# Patient Record
Sex: FEMALE | Race: WHITE | NOT HISPANIC OR LATINO | Employment: FULL TIME | URBAN - METROPOLITAN AREA
[De-identification: names, ages, dates, MRNs, and addresses within clinical notes are randomized per-mention and may not be internally consistent; named-entity substitution may affect disease eponyms.]

---

## 2017-02-13 RX ORDER — PAROXETINE 30 MG/1
TABLET, FILM COATED ORAL
Qty: 90 TABLET | Refills: 1 | Status: SHIPPED | OUTPATIENT
Start: 2017-02-13 | End: 2017-09-03 | Stop reason: SDUPTHER

## 2017-03-20 ENCOUNTER — TELEPHONE (OUTPATIENT)
Dept: FAMILY MEDICINE CLINIC | Facility: CLINIC | Age: 37
End: 2017-03-20

## 2017-03-20 RX ORDER — OSELTAMIVIR PHOSPHATE 75 MG/1
75 CAPSULE ORAL DAILY
Qty: 10 CAPSULE | Refills: 0 | Status: SHIPPED | OUTPATIENT
Start: 2017-03-20 | End: 2017-04-17

## 2017-03-20 NOTE — TELEPHONE ENCOUNTER
If not allergic to it.  Tamiflu 75 mg once a day #10.  For influenza prevention.  I also recommend, as always, that she receive a flu vaccination once here

## 2017-03-20 NOTE — TELEPHONE ENCOUNTER
Pt left voice mail-states daughter was diagnosed with the flu on Friday.  Can we send over Tamiflu or is it too late?  Pt not send since May of last year.

## 2017-04-03 RX ORDER — METOPROLOL SUCCINATE 50 MG/1
TABLET, EXTENDED RELEASE ORAL
Qty: 90 TABLET | Refills: 1 | Status: SHIPPED | OUTPATIENT
Start: 2017-04-03 | End: 2018-03-06 | Stop reason: SDUPTHER

## 2017-04-17 ENCOUNTER — OFFICE VISIT (OUTPATIENT)
Dept: FAMILY MEDICINE CLINIC | Facility: CLINIC | Age: 37
End: 2017-04-17

## 2017-04-17 VITALS
HEIGHT: 66 IN | HEART RATE: 70 BPM | TEMPERATURE: 98 F | DIASTOLIC BLOOD PRESSURE: 65 MMHG | OXYGEN SATURATION: 98 % | SYSTOLIC BLOOD PRESSURE: 110 MMHG | WEIGHT: 202.8 LBS | BODY MASS INDEX: 32.59 KG/M2

## 2017-04-17 DIAGNOSIS — E13.622: ICD-10-CM

## 2017-04-17 DIAGNOSIS — I10 ESSENTIAL HYPERTENSION: Primary | ICD-10-CM

## 2017-04-17 DIAGNOSIS — A60.04 HERPES SIMPLEX VULVOVAGINITIS: ICD-10-CM

## 2017-04-17 DIAGNOSIS — F41.9 ANXIETY: ICD-10-CM

## 2017-04-17 DIAGNOSIS — L98.499: ICD-10-CM

## 2017-04-17 PROCEDURE — 99214 OFFICE O/P EST MOD 30 MIN: CPT | Performed by: FAMILY MEDICINE

## 2017-04-17 RX ORDER — ACYCLOVIR 400 MG/1
400 TABLET ORAL 3 TIMES DAILY
Qty: 21 TABLET | Refills: 11 | Status: SHIPPED | OUTPATIENT
Start: 2017-04-17 | End: 2018-05-03 | Stop reason: SDUPTHER

## 2017-04-17 RX ORDER — CHLORAL HYDRATE 500 MG
CAPSULE ORAL
COMMUNITY

## 2017-04-17 RX ORDER — ELETRIPTAN HYDROBROMIDE 20 MG/1
20 TABLET, FILM COATED ORAL ONCE AS NEEDED
COMMUNITY
End: 2019-03-20

## 2017-04-17 NOTE — PROGRESS NOTES
"Subjective   Greta Campoverde is a 37 y.o. female.     Chief Complaint   Patient presents with   • Hyperlipidemia     medication follow up   • Diabetes        History of Present Illness    Hypertension follow up. Doing well with current medication which she is taking as directed. No known high or low blood pressure episodes. No bothersome cough. No cardiovascular or neurological symptoms. Today's BP: 110/65.  Long history of hypertension.  She continues lisinopril metoprolol.    Diabetes type 2.  Recent A1c reportedly in the 8 range.  She follows with a endocrinologist.  There are also monitoring her extreme hyperlipidemia.  She continues on TriCor and also reportedly she may be taking atorvastatin, although I do not see on the pharmacy log.    Anxiety.  Long-standing.  She continues Paxil 30 mg a day prescribed by me.  Overall she has been stable.    Diabetic skin ulcers.  Most in lower extremities.  She does also pick at them.  She is wondering if there is anything else she can do.    Genital herpes.  Recurrent.  No recent flares.  She takes occasional acyclovir.  Needs a refill.        The following portions of the patient's history were reviewed and updated as appropriate: allergies, current medications, past family history, past medical history, past social history, past surgical history and problem list.          Review of Systems   Constitutional: Negative for activity change and appetite change.   Respiratory: Negative for chest tightness and shortness of breath.    Cardiovascular: Negative for chest pain, palpitations and leg swelling.   Neurological: Positive for numbness ( Lower extremities). Negative for headaches.   Psychiatric/Behavioral: Negative for confusion.       Objective   Blood pressure 110/65, pulse 70, temperature 98 °F (36.7 °C), height 66\" (167.6 cm), weight 202 lb 12.8 oz (92 kg), SpO2 98 %.  Physical Exam   Constitutional: She appears well-developed and well-nourished. No distress.   Eyes: " "Conjunctivae are normal.   Neck: Normal range of motion. No thyromegaly present.   Cardiovascular: Normal rate, regular rhythm and normal heart sounds.    Pulmonary/Chest: Effort normal and breath sounds normal. No respiratory distress.   Musculoskeletal: She exhibits no edema.   Lymphadenopathy:     She has no cervical adenopathy.   Skin: Skin is warm and dry.   She has 2 healing shallow ulcers on the right shin.  One on the left shin that's healed.  There is minimal erythema.  No sinus drainage.   Psychiatric: She has a normal mood and affect. Her behavior is normal. Judgment and thought content normal.   Nursing note and vitals reviewed.      Assessment/Plan   Greta was seen today for hyperlipidemia and diabetes.    Diagnoses and all orders for this visit:    Essential hypertension    Herpes simplex vulvovaginitis  -     acyclovir (ZOVIRAX) 400 MG tablet; Take 1 tablet by mouth 3 (Three) Times a Day.    Skin ulcer due to secondary diabetes mellitus    Anxiety    Other orders  -     mupirocin (BACTROBAN) 2 % ointment; Apply  topically 3 (Three) Times a Day.    Hypertension.  Stable.  Continue lisinopril metoprolol.  Refills are up-to-date.  I will see her in 6 months for recheck.      Diabetes type 2 and hyperlipidemia.  Followed by endocrinology.    Diabetic skin ulcers.  No current sign of cellulitis or other infection.  I'm giving her prescription for Bactrim ointment to use as needed.  May help promote healing and possibly jeet complication.  Also recommend she not \"pick at them\".    General herpes history.  I refilled her acyclovir.    Anxiety.  Stable.  She continues on maintenance dose of Paxil.         "

## 2017-06-05 ENCOUNTER — OFFICE VISIT (OUTPATIENT)
Dept: FAMILY MEDICINE CLINIC | Facility: CLINIC | Age: 37
End: 2017-06-05

## 2017-06-05 VITALS
WEIGHT: 193.7 LBS | BODY MASS INDEX: 31.13 KG/M2 | SYSTOLIC BLOOD PRESSURE: 104 MMHG | HEART RATE: 96 BPM | HEIGHT: 66 IN | TEMPERATURE: 98 F | OXYGEN SATURATION: 98 % | DIASTOLIC BLOOD PRESSURE: 80 MMHG

## 2017-06-05 DIAGNOSIS — J40 BRONCHITIS: Primary | ICD-10-CM

## 2017-06-05 PROCEDURE — 99213 OFFICE O/P EST LOW 20 MIN: CPT | Performed by: FAMILY MEDICINE

## 2017-06-05 RX ORDER — BENZONATATE 100 MG/1
100 CAPSULE ORAL 3 TIMES DAILY PRN
Qty: 21 CAPSULE | Refills: 0 | Status: SHIPPED | OUTPATIENT
Start: 2017-06-05 | End: 2017-06-07

## 2017-06-05 RX ORDER — FLUTICASONE PROPIONATE 220 UG/1
1 AEROSOL, METERED RESPIRATORY (INHALATION)
Qty: 12 G | Refills: 0 | Status: SHIPPED | OUTPATIENT
Start: 2017-06-05 | End: 2019-03-20

## 2017-06-05 NOTE — PROGRESS NOTES
"Subjective   Greta Campoverde is a 37 y.o. female.     Chief Complaint   Patient presents with   • Cough     x 1 month, went to the [facility] and still not better had a cxr done it came back normal        History of Present Illness    Coughing for one month.  Started with some sinus symptoms.  Now into her chest.  She's been to the urgent care center at [place] 3 times.  First time given amoxicillin.  Second time given a Z-Jeremiah.  Third time told she might have an ace inhibitor cough.  However she had stopped the low-dose lisinopril some weeks prior.  The cough gets severe times.  She had a pertussis booster in 2016.  She's had no recent fever shortness breath.  No history of asthma.  Nonsmoker.  She had a chest x-ray done at [facility].  It was read as normal.  I reviewed all records.      The following portions of the patient's history were reviewed and updated as appropriate: allergies, current medications, past family history, past medical history, past social history, past surgical history and problem list.          Review of Systems   Constitutional: Negative for fatigue and fever.   HENT: Positive for congestion.    Respiratory: Positive for cough. Negative for shortness of breath.    Gastrointestinal: Negative.    Skin: Negative for rash.       Objective   Blood pressure 104/80, pulse 96, temperature 98 °F (36.7 °C), temperature source Oral, height 66\" (167.6 cm), weight 193 lb 11.2 oz (87.9 kg), SpO2 98 %.  Physical Exam   Constitutional: No distress.   No acute distress.  Nontoxic.   HENT:   Right Ear: Tympanic membrane, external ear and ear canal normal.   Left Ear: Tympanic membrane, external ear and ear canal normal.   Nose: Nose normal.   Mouth/Throat: Oropharynx is clear and moist. No oropharyngeal exudate.   Eyes: Conjunctivae are normal. Right eye exhibits no discharge. Left eye exhibits no discharge. No scleral icterus.   Cardiovascular: Normal rate.    Pulmonary/Chest: Effort normal and breath sounds " normal. No stridor. No respiratory distress. She has no wheezes. She has no rales.   No tachypnea   Lymphadenopathy:     She has no cervical adenopathy.   Skin: No rash noted.   Nursing note and vitals reviewed.      Assessment/Plan   Greta was seen today for cough.    Diagnoses and all orders for this visit:    Bronchitis    Other orders  -     fluticasone (FLOVENT HFA) 220 MCG/ACT inhaler; Inhale 1 puff 2 (Two) Times a Day.  -     benzonatate (TESSALON PERLES) 100 MG capsule; Take 1 capsule by mouth 3 (Three) Times a Day As Needed for Cough.     bronchitis.  Most likely viral.  Pertussis less likely.  Her pertussis boosters up-to-date.  She start he had a Z-Jeremiah.  No evidence of pneumonia currently.  I'm recommending Flovent with AeroChamber one puff twice a day.  Also Tessalon Perles.  If cough persists greater than 2 more weeks will need further investigation including primary function testing and repeat chest x-ray.  With high fever worsening symptoms she will call.

## 2017-06-07 ENCOUNTER — OFFICE VISIT (OUTPATIENT)
Dept: FAMILY MEDICINE CLINIC | Facility: CLINIC | Age: 37
End: 2017-06-07

## 2017-06-07 ENCOUNTER — TELEPHONE (OUTPATIENT)
Dept: FAMILY MEDICINE CLINIC | Facility: CLINIC | Age: 37
End: 2017-06-07

## 2017-06-07 VITALS
WEIGHT: 192 LBS | OXYGEN SATURATION: 98 % | BODY MASS INDEX: 30.86 KG/M2 | TEMPERATURE: 98.4 F | HEIGHT: 66 IN | SYSTOLIC BLOOD PRESSURE: 130 MMHG | DIASTOLIC BLOOD PRESSURE: 90 MMHG | HEART RATE: 92 BPM

## 2017-06-07 DIAGNOSIS — R05.9 COUGH: Primary | ICD-10-CM

## 2017-06-07 PROCEDURE — 3006F CXR DOC REV: CPT | Performed by: FAMILY MEDICINE

## 2017-06-07 PROCEDURE — 99214 OFFICE O/P EST MOD 30 MIN: CPT | Performed by: FAMILY MEDICINE

## 2017-06-07 PROCEDURE — 71020 CHG CHEST X-RAY 2 VW: CPT | Performed by: FAMILY MEDICINE

## 2017-06-07 RX ORDER — ONDANSETRON 8 MG/1
8 TABLET, ORALLY DISINTEGRATING ORAL EVERY 8 HOURS PRN
Qty: 8 TABLET | Refills: 0 | Status: SHIPPED | OUTPATIENT
Start: 2017-06-07 | End: 2019-03-20

## 2017-06-07 RX ORDER — DEXTROMETHORPHAN HYDROBROMIDE AND PROMETHAZINE HYDROCHLORIDE 15; 6.25 MG/5ML; MG/5ML
5 SYRUP ORAL 4 TIMES DAILY PRN
Qty: 118 ML | Refills: 0 | Status: SHIPPED | OUTPATIENT
Start: 2017-06-07 | End: 2017-06-07

## 2017-06-07 NOTE — PROGRESS NOTES
"Subjective   Greta Campoverde is a 37 y.o. female.     Chief Complaint   Patient presents with   • Cough     x6/5/17 not better   • Diarrhea   • Vomiting   • Fatigue        History of Present Illness    Persisting cough.  Number of weeks now.  This is at least her fifth visit with a doctor.  She's been given amoxicillin.  A Z-Jeremiah.  I saw her recently and started her on Flovent and Tessalon Perles.  She has comorbidities including diabetes and chronic back pain.  She is on multiple medications.  She called earlier today stating that the cough is getting much worse.  I try to send in some promethazine dextromethorphan cough syrup, however it is on back order.    In the last 24 hours she's had some vomiting and diarrhea.  No blood in the stool.  No fever.  She is using the Flovent inhaler.  She's taking the Tessalon Perles with no improvement in the cough.    She is unsure when her last TdaP was.  No known sick contacts.      The following portions of the patient's history were reviewed and updated as appropriate: allergies, current medications, past family history, past medical history, past social history, past surgical history and problem list.          Review of Systems   Constitutional: Negative for fatigue and fever.   HENT: Positive for congestion.    Respiratory: Positive for cough.    Gastrointestinal: Positive for diarrhea, nausea and vomiting.   Skin: Negative for rash.       Objective   Blood pressure 130/90, pulse 92, temperature 98.4 °F (36.9 °C), temperature source Oral, height 66\" (167.6 cm), weight 192 lb (87.1 kg), SpO2 98 %.  Physical Exam   Constitutional: No distress.   No acute distress.  Nontoxic.   HENT:   Right Ear: Tympanic membrane, external ear and ear canal normal.   Left Ear: Tympanic membrane, external ear and ear canal normal.   Nose: Nose normal.   Mouth/Throat: Oropharynx is clear and moist. No oropharyngeal exudate.   Eyes: Conjunctivae are normal. Right eye exhibits no discharge. Left " eye exhibits no discharge. No scleral icterus.   Cardiovascular: Normal rate.    Pulmonary/Chest: Effort normal and breath sounds normal. No stridor. No respiratory distress. She has no wheezes. She has no rales.   No tachypnea   Abdominal: Soft. Bowel sounds are normal. She exhibits no distension and no mass. There is no tenderness. There is no rebound and no guarding.   Lymphadenopathy:     She has no cervical adenopathy.   Skin: No rash noted.   Nursing note and vitals reviewed.    Chest x-ray.  Indication persistent cough.  No prior to compare.  No infiltrate seen.  Normal cardiac silhouette.  No effusion.  Final report pending.    Assessment/Plan   Greta was seen today for cough, diarrhea, vomiting and fatigue.    Diagnoses and all orders for this visit:    Cough  -     XR Chest PA & Lateral (In Office)  -     B Pertussis IgM Ab  -     B Pertussis IgA & IgG Ab    Other orders  -     ondansetron ODT (ZOFRAN ODT) 8 MG disintegrating tablet; Take 1 tablet by mouth Every 8 (Eight) Hours As Needed for Nausea or Vomiting.    Persisting cough.  Bronchitis.  No evidence of pneumonia.  Vomiting and diarrhea, may be a secondary virus.  Or possibly related.  At this time recommend Zofran for nausea.  Over-the-counter rehydration formula.  At this time no evidence of dehydration.      I'm concerned about possible pertussis.  The cough is getting paroxysmal.  She had a azithromycin pack a few weeks ago which will cover this.  However the cough can often persist.  I'm checking IgM IgA and IgG pertussis antibodies.  I will see her back as needed especially with worsening symptoms.  Work note given.  If pertussis test is positive for recent infection, contacts will need to be treated.

## 2017-06-07 NOTE — TELEPHONE ENCOUNTER
The medication is on back order and per Dr. Olivo patient will need to be seen in the office today.

## 2017-06-07 NOTE — TELEPHONE ENCOUNTER
We can see her in the office if needed.  However I did send a prescription for Phenergan dextromethorphan cough syrup to her pharmacy.  She must seek medical attention with high fever or worsening symptoms.

## 2017-06-07 NOTE — TELEPHONE ENCOUNTER
Pt is calling saying that she is still having this horrible cough and now is vomiting with diarrhea at the same time. She is not sure at what point should she be concerned with this due to her being a diabetic. She has been seen at different [facility] with these symptoms with no relief.   She has not been able to keep anything down due to every time she coughs she gets sick, Please advise.

## 2017-06-09 LAB
B PERT IGA SER QL IA: 1.1 INDEX (ref 0–0.9)
B PERT IGG SER-ACNC: 2.06 INDEX (ref 0–0.94)
B PERT IGM SER QL IA: <1 INDEX (ref 0–0.9)

## 2017-06-09 RX ORDER — DOXYCYCLINE HYCLATE 100 MG/1
100 CAPSULE ORAL 2 TIMES DAILY
Qty: 20 CAPSULE | Refills: 0 | Status: SHIPPED | OUTPATIENT
Start: 2017-06-09 | End: 2017-09-12

## 2017-06-09 NOTE — PROGRESS NOTES
Pertussis results are back.  Likely not recent pertussis infection.  Evidence of probable old immunization.

## 2017-06-21 ENCOUNTER — TELEPHONE (OUTPATIENT)
Dept: FAMILY MEDICINE CLINIC | Facility: CLINIC | Age: 37
End: 2017-06-21

## 2017-06-21 NOTE — TELEPHONE ENCOUNTER
Likely related to prolonged standing?  I reviewed her medications.  Likely not from medication.  I'll be happy to see her in the office for further investigation.

## 2017-06-21 NOTE — TELEPHONE ENCOUNTER
Pt is calling saying that she is now having bilateral ankle swelling. She is not sure when it has started just noticed it during the summer when she has been wearing shorts. Its not always both ankles it might be one or the other and when she is siting down working 8 hours. Please advise.

## 2017-06-28 ENCOUNTER — TELEPHONE (OUTPATIENT)
Dept: FAMILY MEDICINE CLINIC | Facility: CLINIC | Age: 37
End: 2017-06-28

## 2017-06-28 NOTE — TELEPHONE ENCOUNTER
I think exercise would be good for her.  She can make an appointment to see me if she would like.  I typically would not write a note for this however.

## 2017-06-28 NOTE — TELEPHONE ENCOUNTER
Pt is calling saying that she [place]. They are having her park about 3-4 blocks away with her back pain and ankles now swelling she is wondering if you can write her a note saying that  She has to be parked closer to her work do to her medical condition. Until they can move her to a new garage which is closer. Please Advise.

## 2017-09-05 RX ORDER — PAROXETINE 30 MG/1
TABLET, FILM COATED ORAL
Qty: 90 TABLET | Refills: 1 | Status: SHIPPED | OUTPATIENT
Start: 2017-09-05 | End: 2018-03-20 | Stop reason: SDUPTHER

## 2017-09-12 ENCOUNTER — OFFICE VISIT (OUTPATIENT)
Dept: FAMILY MEDICINE CLINIC | Facility: CLINIC | Age: 37
End: 2017-09-12

## 2017-09-12 VITALS
HEART RATE: 97 BPM | DIASTOLIC BLOOD PRESSURE: 82 MMHG | WEIGHT: 199.1 LBS | OXYGEN SATURATION: 97 % | HEIGHT: 66 IN | BODY MASS INDEX: 32 KG/M2 | SYSTOLIC BLOOD PRESSURE: 128 MMHG | TEMPERATURE: 98.2 F

## 2017-09-12 DIAGNOSIS — J06.9 VIRAL URI: Primary | ICD-10-CM

## 2017-09-12 PROCEDURE — 99213 OFFICE O/P EST LOW 20 MIN: CPT | Performed by: FAMILY MEDICINE

## 2017-09-12 RX ORDER — BENZONATATE 100 MG/1
100 CAPSULE ORAL 3 TIMES DAILY PRN
Qty: 21 CAPSULE | Refills: 0 | Status: SHIPPED | OUTPATIENT
Start: 2017-09-12 | End: 2019-03-20

## 2017-09-12 NOTE — PROGRESS NOTES
"Subjective   Greta Campoverde is a 37 y.o. female.     Chief Complaint   Patient presents with   • Cough     pulling at right eye x fri   • Headache   • URI        History of Present Illness    URI symptoms times for 5 days.  Some headache.  It feels like \"her right eye is pulling\".  No eye discharge.  Some sinus pressure.  Some sore throat.  Hacking cough and nighttime.  Patient takes hydrocodone once or twice a day prescribed by pain management.  History of uncontrolled diabetes but reportedly the A1c is slightly improved in the 9 range.  She's had no high fever otherwise feels well.  She is concerned about another bout of bronchitis that she had earlier this year.      The following portions of the patient's history were reviewed and updated as appropriate: allergies, current medications, past family history, past medical history, past social history, past surgical history and problem list.          Review of Systems   Constitutional: Negative for fatigue and fever.   HENT: Positive for congestion and sore throat.    Respiratory: Positive for cough.    Gastrointestinal: Positive for diarrhea ( The last few days).   Skin: Negative for rash.       Objective   Blood pressure 128/82, pulse 97, temperature 98.2 °F (36.8 °C), temperature source Oral, height 66\" (167.6 cm), weight 199 lb 1.6 oz (90.3 kg), SpO2 97 %.  Physical Exam   Constitutional: No distress.   No acute distress.  Nontoxic.   HENT:   Right Ear: Tympanic membrane, external ear and ear canal normal.   Left Ear: Tympanic membrane, external ear and ear canal normal.   Nose: Nose normal.   Mouth/Throat: Oropharynx is clear and moist. No oropharyngeal exudate.   Eyes: Conjunctivae are normal. Right eye exhibits no discharge. Left eye exhibits no discharge. No scleral icterus.   Cardiovascular: Normal rate.    Pulmonary/Chest: Effort normal and breath sounds normal. No stridor. No respiratory distress. She has no wheezes. She has no rales.   No tachypnea "   Lymphadenopathy:     She has cervical adenopathy ( Minimal anterior lymphadenopathy).   Skin: No rash noted.   Nursing note and vitals reviewed.      Assessment/Plan   Greta was seen today for cough, headache and uri.    Diagnoses and all orders for this visit:    Viral URI    Other orders  -     benzonatate (TESSALON PERLES) 100 MG capsule; Take 1 capsule by mouth 3 (Three) Times a Day As Needed for Cough.      Viral URI.  5 days duration.  Possibly evolving viral bronchitis.  Tessalon Perles as needed.  Rest.  She has not missed work other than a Monday.  Patient understands that with persistent symptoms greater than 10 days, sudden fever, shortness of breath, or worsening concerns, please seek medical attention immediately.  Otherwise we'll have to likely run its course.

## 2018-02-06 ENCOUNTER — OFFICE VISIT (OUTPATIENT)
Dept: FAMILY MEDICINE CLINIC | Facility: CLINIC | Age: 38
End: 2018-02-06

## 2018-02-06 VITALS — BODY MASS INDEX: 31.27 KG/M2 | WEIGHT: 194.6 LBS | HEIGHT: 66 IN

## 2018-02-06 DIAGNOSIS — N89.8 VAGINAL ITCHING: Primary | ICD-10-CM

## 2018-02-06 LAB
BILIRUB BLD-MCNC: NEGATIVE MG/DL
CLARITY, POC: CLEAR
COLOR UR: YELLOW
GLUCOSE UR STRIP-MCNC: ABNORMAL MG/DL
KETONES UR QL: ABNORMAL
LEUKOCYTE EST, POC: NEGATIVE
NITRITE UR-MCNC: NEGATIVE MG/ML
PH UR: 6.5 [PH] (ref 5–8)
PROT UR STRIP-MCNC: ABNORMAL MG/DL
RBC # UR STRIP: NEGATIVE /UL
SP GR UR: 1.01 (ref 1–1.03)
UROBILINOGEN UR QL: NORMAL

## 2018-02-06 PROCEDURE — 99213 OFFICE O/P EST LOW 20 MIN: CPT | Performed by: FAMILY MEDICINE

## 2018-02-06 PROCEDURE — 81003 URINALYSIS AUTO W/O SCOPE: CPT | Performed by: FAMILY MEDICINE

## 2018-02-06 RX ORDER — FLUCONAZOLE 150 MG/1
150 TABLET ORAL ONCE
Qty: 1 TABLET | Refills: 0 | Status: SHIPPED | OUTPATIENT
Start: 2018-02-06 | End: 2018-02-06

## 2018-02-06 NOTE — PROGRESS NOTES
"Subjective   Greta Campoverde is a 38 y.o. female.     Chief Complaint   Patient presents with   • Groin Swelling     in vaginal   • Vaginal Itching        History of Present Illness    A few days of vaginal itching and vulvar swelling.  No discharge.  She thought she might have been having a flareup of genital herpes.  She took valacyclovir.  However no change.  She states they're symmetric vulvar swelling and itching.  No dysuria.  No fever.  No severe abdominal pain.  She states her diabetes is been under worse control.  She has not been no gynecologist.  History of \"partial hysterectomy\".      The following portions of the patient's history were reviewed and updated as appropriate: allergies, current medications, past family history, past medical history, past social history, past surgical history and problem list.          Review of Systems   Constitutional: Negative for fever.   Gastrointestinal: Negative for abdominal pain.   Genitourinary: Negative for dysuria, flank pain and hematuria.       Objective   Height 167.6 cm (65.98\"), weight 88.3 kg (194 lb 9.6 oz).  Physical Exam   Constitutional: No distress.   Abdominal: Soft. There is no tenderness.   Skin: She is not diaphoretic.     Urinalysis is unremarkable overall.  No leukocytes.  No nitrites.    Assessment/Plan   Greta was seen today for groin swelling and vaginal itching.    Diagnoses and all orders for this visit:    Vaginal itching  -     POC Urinalysis Dipstick, Automated  -     Ambulatory Referral to Obstetrics / Gynecology    Other orders  -     fluconazole (DIFLUCAN) 150 MG tablet; Take 1 tablet by mouth 1 (One) Time for 1 dose.      Vaginal itching.  Probable vaginitis.  Likely yeast given her diabetes.  Diflucan 150 mg ×1.  Also referred her for gynecologist for follow-up.  With severe urinary symptoms such as burning with urination, blood in the urine, or fever, patient is instructed to seek medical attention immediately.         "

## 2018-03-06 RX ORDER — METOPROLOL SUCCINATE 50 MG/1
TABLET, EXTENDED RELEASE ORAL
Qty: 90 TABLET | Refills: 0 | Status: SHIPPED | OUTPATIENT
Start: 2018-03-06 | End: 2018-05-31 | Stop reason: SDUPTHER

## 2018-03-21 RX ORDER — PAROXETINE 30 MG/1
TABLET, FILM COATED ORAL
Qty: 90 TABLET | Refills: 1 | Status: SHIPPED | OUTPATIENT
Start: 2018-03-21 | End: 2018-10-01 | Stop reason: SDUPTHER

## 2018-05-03 DIAGNOSIS — A60.04 HERPES SIMPLEX VULVOVAGINITIS: ICD-10-CM

## 2018-05-03 RX ORDER — ACYCLOVIR 400 MG/1
TABLET ORAL
Qty: 90 TABLET | Refills: 1 | Status: SHIPPED | OUTPATIENT
Start: 2018-05-03 | End: 2019-05-27 | Stop reason: SDUPTHER

## 2018-06-01 RX ORDER — METOPROLOL SUCCINATE 50 MG/1
TABLET, EXTENDED RELEASE ORAL
Qty: 90 TABLET | Refills: 1 | Status: SHIPPED | OUTPATIENT
Start: 2018-06-01 | End: 2018-12-26 | Stop reason: SDUPTHER

## 2018-10-01 RX ORDER — PAROXETINE 30 MG/1
TABLET, FILM COATED ORAL
Qty: 90 TABLET | Refills: 1 | Status: SHIPPED | OUTPATIENT
Start: 2018-10-01 | End: 2019-04-25 | Stop reason: SDUPTHER

## 2018-12-26 RX ORDER — METOPROLOL SUCCINATE 50 MG/1
TABLET, EXTENDED RELEASE ORAL
Qty: 90 TABLET | Refills: 1 | Status: SHIPPED | OUTPATIENT
Start: 2018-12-26 | End: 2019-07-25 | Stop reason: SDUPTHER

## 2019-03-20 ENCOUNTER — OFFICE VISIT (OUTPATIENT)
Dept: FAMILY MEDICINE CLINIC | Facility: CLINIC | Age: 39
End: 2019-03-20

## 2019-03-20 VITALS
TEMPERATURE: 97.2 F | BODY MASS INDEX: 32.06 KG/M2 | SYSTOLIC BLOOD PRESSURE: 128 MMHG | DIASTOLIC BLOOD PRESSURE: 74 MMHG | HEIGHT: 66 IN | HEART RATE: 68 BPM | WEIGHT: 199.5 LBS | OXYGEN SATURATION: 100 %

## 2019-03-20 DIAGNOSIS — M79.89 LEG SWELLING: ICD-10-CM

## 2019-03-20 DIAGNOSIS — E11.65 TYPE 2 DIABETES MELLITUS WITH HYPERGLYCEMIA, WITHOUT LONG-TERM CURRENT USE OF INSULIN (HCC): Primary | ICD-10-CM

## 2019-03-20 PROBLEM — R87.810 ASCUS WITH POSITIVE HIGH RISK HPV CERVICAL: Status: ACTIVE | Noted: 2019-03-20

## 2019-03-20 PROBLEM — B00.9 HSV INFECTION: Status: ACTIVE | Noted: 2018-08-13

## 2019-03-20 PROBLEM — N83.201 RIGHT OVARIAN CYST: Status: ACTIVE | Noted: 2018-09-26

## 2019-03-20 PROBLEM — R87.610 ASCUS WITH POSITIVE HIGH RISK HPV CERVICAL: Status: ACTIVE | Noted: 2019-03-20

## 2019-03-20 PROCEDURE — 99214 OFFICE O/P EST MOD 30 MIN: CPT | Performed by: FAMILY MEDICINE

## 2019-03-20 RX ORDER — TIZANIDINE 2 MG/1
2 TABLET ORAL EVERY 8 HOURS PRN
COMMUNITY
Start: 2019-03-13

## 2019-03-20 RX ORDER — METFORMIN HYDROCHLORIDE 500 MG/1
500 TABLET, EXTENDED RELEASE ORAL 2 TIMES DAILY
COMMUNITY
Start: 2018-08-07

## 2019-03-20 RX ORDER — DICLOFENAC SODIUM 75 MG/1
75 TABLET, DELAYED RELEASE ORAL 2 TIMES DAILY
COMMUNITY
Start: 2019-03-11 | End: 2020-09-01

## 2019-03-20 RX ORDER — FUROSEMIDE 20 MG/1
20 TABLET ORAL DAILY PRN
Qty: 10 TABLET | Refills: 0 | Status: SHIPPED | OUTPATIENT
Start: 2019-03-20 | End: 2019-06-26 | Stop reason: SDUPTHER

## 2019-03-20 RX ORDER — IBUPROFEN 800 MG/1
800 TABLET ORAL
COMMUNITY
Start: 2018-10-13 | End: 2020-09-01

## 2019-03-20 NOTE — PROGRESS NOTES
"Subjective   Greta Campoverde is a 39 y.o. female.     Chief Complaint   Patient presents with   • Joint Swelling     left leg x 1wk    • Tingling     in left leg   • Numbness     in left leg        History of Present Illness    Left lower extremity swelling for 2 or 3 days.  History of uncontrolled diabetes, and diabetic neuropathy.  She states she has had numbness along the left lower extremity that she has had related to her neuropathy in the past.  She describes some coolness to sensation but no claudication symptoms.  No color changes in lower extremities.  She does not feel pain along the calf muscle but she states she has knee pain.  Also some left lower back pain.  She has had no recent trauma.  She has no history of venous thrombosis.  She continues to follow with her endocrinologist.  She states her A1c level has improved.  Previously 10.  Now lower.  She states her blood sugar was high this morning, near 500.    The following portions of the patient's history were reviewed and updated as appropriate: allergies, current medications, past family history, past medical history, past social history, past surgical history and problem list.          Review of Systems   Constitutional: Negative.  Negative for fever.   Respiratory: Negative.  Negative for shortness of breath.    Cardiovascular: Positive for leg swelling.   Musculoskeletal: Positive for back pain.   Neurological: Positive for numbness. Negative for weakness.   Psychiatric/Behavioral: Negative for dysphoric mood.       Objective   Blood pressure 128/74, pulse 68, temperature 97.2 °F (36.2 °C), temperature source Oral, height 167.6 cm (65.98\"), weight 90.5 kg (199 lb 8 oz), SpO2 100 %.  Physical Exam   Constitutional: She appears well-developed and well-nourished. No distress.   Neck: No thyromegaly present.   Cardiovascular: Normal rate, regular rhythm, normal heart sounds and intact distal pulses.   Pulmonary/Chest: Effort normal and breath sounds " "normal.   Musculoskeletal: She exhibits no edema.   Lower extremities.  She has bilateral +1 pitting edema, greater at the left ankle,.  She is got no tenderness at the calf muscle or popliteal fossa.  She has some coolness to touch in the lung the lower extremity, but no erythema.  There is no signs of digital ischemia.  The gait is unremarkable.   Skin: Skin is warm and dry.   Psychiatric: She has a normal mood and affect. Her behavior is normal. Judgment and thought content normal.   Nursing note and vitals reviewed.      Assessment/Plan   Greta was seen today for joint swelling, tingling and numbness.    Diagnoses and all orders for this visit:    Type 2 diabetes mellitus with hyperglycemia, without long-term current use of insulin (CMS/[facility])  -     Comprehensive Metabolic Panel    Leg swelling    Other orders  -     furosemide (LASIX) 20 MG tablet; Take 1 tablet by mouth Daily As Needed (ankle swelling).      39-year-old female with with long-standing uncontrolled diabetes and diabetic neuropathy presents with lower extremity discomfort on the left side and swelling.  At this point most likely dependent edema.  Recommending Lasix 20 mg a day for a few days just as needed.  Number 10 pills no refills.  Checking a BMP.  The patient and her family are very concerned about a possible \"blood clot\".  At this point the probability of a DVT is very low.  However given her diabetic condition, especially at risk for vasculopathy, I cannot 100% rule out a DVT.  Also she is leaving for [place] next week.  We will order a routine lower extremity Doppler ultrasound.  She will follow-up with her endocrinologist as scheduled.         "

## 2019-03-21 LAB
ALBUMIN SERPL-MCNC: 4.1 G/DL (ref 3.5–5.2)
ALBUMIN/GLOB SERPL: 1.4 G/DL
ALP SERPL-CCNC: 83 U/L (ref 39–117)
ALT SERPL-CCNC: 41 U/L (ref 1–33)
AST SERPL-CCNC: 44 U/L (ref 1–32)
BILIRUB SERPL-MCNC: 0.3 MG/DL (ref 0.2–1.2)
BUN SERPL-MCNC: 10 MG/DL (ref 6–20)
BUN/CREAT SERPL: 19.6 (ref 7–25)
CALCIUM SERPL-MCNC: 9.6 MG/DL (ref 8.6–10.5)
CHLORIDE SERPL-SCNC: 103 MMOL/L (ref 98–107)
CO2 SERPL-SCNC: 19.6 MMOL/L (ref 22–29)
CREAT SERPL-MCNC: 0.51 MG/DL (ref 0.57–1)
GLOBULIN SER CALC-MCNC: 2.9 GM/DL
GLUCOSE SERPL-MCNC: 112 MG/DL (ref 65–99)
POTASSIUM SERPL-SCNC: 3.7 MMOL/L (ref 3.5–5.2)
PROT SERPL-MCNC: 7 G/DL (ref 6–8.5)
SODIUM SERPL-SCNC: 137 MMOL/L (ref 136–145)

## 2019-03-21 NOTE — PROGRESS NOTES
Kidney function was normal.  The liver enzymes were just minimally elevated.  Follow-up with endocrinologist as scheduled.

## 2019-03-22 ENCOUNTER — HOSPITAL ENCOUNTER (OUTPATIENT)
Dept: CARDIOLOGY | Facility: HOSPITAL | Age: 39
Discharge: HOME | End: 2019-03-22
Admitting: FAMILY MEDICINE

## 2019-03-22 LAB
BH CV LOWER VASCULAR LEFT COMMON FEMORAL AUGMENT: NORMAL
BH CV LOWER VASCULAR LEFT COMMON FEMORAL COMPETENT: NORMAL
BH CV LOWER VASCULAR LEFT COMMON FEMORAL COMPRESS: NORMAL
BH CV LOWER VASCULAR LEFT COMMON FEMORAL PHASIC: NORMAL
BH CV LOWER VASCULAR LEFT COMMON FEMORAL SPONT: NORMAL
BH CV LOWER VASCULAR LEFT DISTAL FEMORAL COMPRESS: NORMAL
BH CV LOWER VASCULAR LEFT GASTRONEMIUS COMPRESS: NORMAL
BH CV LOWER VASCULAR LEFT GREATER SAPH AK COMPRESS: NORMAL
BH CV LOWER VASCULAR LEFT GREATER SAPH BK COMPRESS: NORMAL
BH CV LOWER VASCULAR LEFT LESSER SAPH COMPRESS: NORMAL
BH CV LOWER VASCULAR LEFT MID FEMORAL AUGMENT: NORMAL
BH CV LOWER VASCULAR LEFT MID FEMORAL COMPETENT: NORMAL
BH CV LOWER VASCULAR LEFT MID FEMORAL COMPRESS: NORMAL
BH CV LOWER VASCULAR LEFT MID FEMORAL PHASIC: NORMAL
BH CV LOWER VASCULAR LEFT MID FEMORAL SPONT: NORMAL
BH CV LOWER VASCULAR LEFT PERONEAL COMPRESS: NORMAL
BH CV LOWER VASCULAR LEFT POPLITEAL AUGMENT: NORMAL
BH CV LOWER VASCULAR LEFT POPLITEAL COMPETENT: NORMAL
BH CV LOWER VASCULAR LEFT POPLITEAL COMPRESS: NORMAL
BH CV LOWER VASCULAR LEFT POPLITEAL PHASIC: NORMAL
BH CV LOWER VASCULAR LEFT POPLITEAL SPONT: NORMAL
BH CV LOWER VASCULAR LEFT POSTERIOR TIBIAL COMPRESS: NORMAL
BH CV LOWER VASCULAR LEFT PROXIMAL FEMORAL COMPRESS: NORMAL
BH CV LOWER VASCULAR LEFT SAPHENOFEMORAL JUNCTION AUGMENT: NORMAL
BH CV LOWER VASCULAR LEFT SAPHENOFEMORAL JUNCTION COMPETENT: NORMAL
BH CV LOWER VASCULAR LEFT SAPHENOFEMORAL JUNCTION COMPRESS: NORMAL
BH CV LOWER VASCULAR LEFT SAPHENOFEMORAL JUNCTION PHASIC: NORMAL
BH CV LOWER VASCULAR LEFT SAPHENOFEMORAL JUNCTION SPONT: NORMAL
BH CV LOWER VASCULAR RIGHT COMMON FEMORAL AUGMENT: NORMAL
BH CV LOWER VASCULAR RIGHT COMMON FEMORAL COMPETENT: NORMAL
BH CV LOWER VASCULAR RIGHT COMMON FEMORAL COMPRESS: NORMAL
BH CV LOWER VASCULAR RIGHT COMMON FEMORAL PHASIC: NORMAL
BH CV LOWER VASCULAR RIGHT COMMON FEMORAL SPONT: NORMAL

## 2019-03-22 PROCEDURE — 93971 EXTREMITY STUDY: CPT

## 2019-04-25 RX ORDER — CLONAZEPAM 0.5 MG/1
TABLET ORAL
COMMUNITY
Start: 2019-04-17

## 2019-04-25 RX ORDER — PAROXETINE 30 MG/1
30 TABLET, FILM COATED ORAL DAILY
Qty: 90 TABLET | Refills: 1 | Status: SHIPPED | OUTPATIENT
Start: 2019-04-25 | End: 2019-11-02 | Stop reason: SDUPTHER

## 2019-05-27 DIAGNOSIS — A60.04 HERPES SIMPLEX VULVOVAGINITIS: ICD-10-CM

## 2019-05-28 RX ORDER — ACYCLOVIR 400 MG/1
TABLET ORAL
Qty: 90 TABLET | Refills: 1 | Status: SHIPPED | OUTPATIENT
Start: 2019-05-28

## 2019-06-26 RX ORDER — FUROSEMIDE 20 MG/1
TABLET ORAL
Qty: 10 TABLET | Refills: 0 | Status: SHIPPED | OUTPATIENT
Start: 2019-06-26 | End: 2020-09-01

## 2019-07-26 RX ORDER — METOPROLOL SUCCINATE 50 MG/1
TABLET, EXTENDED RELEASE ORAL
Qty: 90 TABLET | Refills: 1 | Status: SHIPPED | OUTPATIENT
Start: 2019-07-26 | End: 2020-02-12 | Stop reason: SDUPTHER

## 2019-11-04 RX ORDER — PAROXETINE 30 MG/1
TABLET, FILM COATED ORAL
Qty: 90 TABLET | Refills: 1 | Status: SHIPPED | OUTPATIENT
Start: 2019-11-04 | End: 2020-05-06

## 2020-02-12 DIAGNOSIS — E78.00 PURE HYPERCHOLESTEROLEMIA: ICD-10-CM

## 2020-02-12 DIAGNOSIS — E11.649 UNCONTROLLED TYPE 2 DIABETES MELLITUS WITH HYPOGLYCEMIA WITHOUT COMA (HCC): Primary | ICD-10-CM

## 2020-02-12 DIAGNOSIS — Z00.00 HEALTH CARE MAINTENANCE: ICD-10-CM

## 2020-02-12 RX ORDER — METOPROLOL SUCCINATE 50 MG/1
50 TABLET, EXTENDED RELEASE ORAL DAILY
Qty: 90 TABLET | Refills: 1 | Status: SHIPPED | OUTPATIENT
Start: 2020-02-12 | End: 2020-08-25

## 2020-02-12 RX ORDER — METOPROLOL SUCCINATE 50 MG/1
TABLET, EXTENDED RELEASE ORAL
Qty: 90 TABLET | Refills: 1 | OUTPATIENT
Start: 2020-02-12

## 2020-02-12 NOTE — TELEPHONE ENCOUNTER
Please schedule a follow up(physical)30 apt with Labs. Let me know when this is done.   Thanks,   Christine Francisco was just seen 1 month and follow up scheduled.

## 2020-02-12 NOTE — TELEPHONE ENCOUNTER
Pt scheduled for 2/26/2020 for physical. [facility]. She is going there already for her Endo labs. Can we place order and fax?

## 2020-02-16 LAB
ALBUMIN SERPL-MCNC: 4.3 G/DL (ref 3.8–4.8)
ALBUMIN/CREAT UR: 96 MG/G CREAT (ref 0–29)
ALBUMIN/GLOB SERPL: 1.6 {RATIO} (ref 1.2–2.2)
ALP SERPL-CCNC: 92 IU/L (ref 39–117)
ALT SERPL-CCNC: 27 IU/L (ref 0–32)
AST SERPL-CCNC: 23 IU/L (ref 0–40)
BASOPHILS # BLD AUTO: 0 X10E3/UL (ref 0–0.2)
BASOPHILS NFR BLD AUTO: 0 %
BILIRUB SERPL-MCNC: 0.3 MG/DL (ref 0–1.2)
BUN SERPL-MCNC: 12 MG/DL (ref 6–24)
BUN/CREAT SERPL: 18 (ref 9–23)
CALCIUM SERPL-MCNC: 9.7 MG/DL (ref 8.7–10.2)
CHLORIDE SERPL-SCNC: 97 MMOL/L (ref 96–106)
CHOLEST SERPL-MCNC: 544 MG/DL (ref 100–199)
CO2 SERPL-SCNC: 17 MMOL/L (ref 20–29)
CREAT SERPL-MCNC: 0.66 MG/DL (ref 0.57–1)
CREAT UR-MCNC: 244.3 MG/DL
EOSINOPHIL # BLD AUTO: 0.3 X10E3/UL (ref 0–0.4)
EOSINOPHIL NFR BLD AUTO: 5 %
ERYTHROCYTE [DISTWIDTH] IN BLOOD BY AUTOMATED COUNT: 15.2 % (ref 11.7–15.4)
GLOBULIN SER CALC-MCNC: 2.7 G/DL (ref 1.5–4.5)
GLUCOSE SERPL-MCNC: 286 MG/DL (ref 65–99)
HBA1C MFR BLD: 10.4 % (ref 4.8–5.6)
HCT VFR BLD AUTO: 38.7 % (ref 34–46.6)
HDLC SERPL-MCNC: 9 MG/DL
HGB BLD-MCNC: 13.6 G/DL (ref 11.1–15.9)
IMM GRANULOCYTES # BLD AUTO: 0 X10E3/UL (ref 0–0.1)
IMM GRANULOCYTES NFR BLD AUTO: 0 %
LDLC SERPL CALC-MCNC: ABNORMAL MG/DL (ref 0–99)
LYMPHOCYTES # BLD AUTO: 2.7 X10E3/UL (ref 0.7–3.1)
LYMPHOCYTES NFR BLD AUTO: 46 %
MCH RBC QN AUTO: 30.2 PG (ref 26.6–33)
MCHC RBC AUTO-ENTMCNC: 35.1 G/DL (ref 31.5–35.7)
MCV RBC AUTO: 86 FL (ref 79–97)
MICROALBUMIN UR-MCNC: 235.1 UG/ML
MONOCYTES # BLD AUTO: 0.3 X10E3/UL (ref 0.1–0.9)
MONOCYTES NFR BLD AUTO: 4 %
MORPHOLOGY BLD-IMP: NORMAL
NEUTROPHILS # BLD AUTO: 2.7 X10E3/UL (ref 1.4–7)
NEUTROPHILS NFR BLD AUTO: 45 %
PLATELET # BLD AUTO: 270 X10E3/UL (ref 150–450)
POTASSIUM SERPL-SCNC: 3.7 MMOL/L (ref 3.5–5.2)
PROT SERPL-MCNC: 7 G/DL (ref 6–8.5)
RBC # BLD AUTO: 4.5 X10E6/UL (ref 3.77–5.28)
SODIUM SERPL-SCNC: 131 MMOL/L (ref 134–144)
TRIGL SERPL-MCNC: 3982 MG/DL (ref 0–149)
VLDLC SERPL CALC-MCNC: ABNORMAL MG/DL (ref 5–40)
WBC # BLD AUTO: 6 X10E3/UL (ref 3.4–10.8)

## 2020-02-17 NOTE — PROGRESS NOTES
The diabetes is very poorly controlled.  The triglycerides are extremely high.  We will discuss more at upcoming visit.

## 2020-05-06 RX ORDER — PAROXETINE 30 MG/1
TABLET, FILM COATED ORAL
Qty: 90 TABLET | Refills: 0 | Status: SHIPPED | OUTPATIENT
Start: 2020-05-06 | End: 2020-08-25

## 2020-08-25 RX ORDER — METOPROLOL SUCCINATE 50 MG/1
50 TABLET, EXTENDED RELEASE ORAL DAILY
Qty: 90 TABLET | Refills: 0 | Status: SHIPPED | OUTPATIENT
Start: 2020-08-25 | End: 2020-11-19

## 2020-08-25 RX ORDER — PAROXETINE 30 MG/1
TABLET, FILM COATED ORAL
Qty: 90 TABLET | Refills: 0 | Status: SHIPPED | OUTPATIENT
Start: 2020-08-25 | End: 2020-11-19

## 2020-08-25 RX ORDER — METOPROLOL SUCCINATE 50 MG/1
TABLET, EXTENDED RELEASE ORAL
Qty: 90 TABLET | Refills: 0 | Status: SHIPPED | OUTPATIENT
Start: 2020-08-25 | End: 2020-08-25 | Stop reason: SDUPTHER

## 2020-08-26 RX ORDER — METOPROLOL SUCCINATE 50 MG/1
TABLET, EXTENDED RELEASE ORAL
Qty: 90 TABLET | Refills: 0 | OUTPATIENT
Start: 2020-08-26

## 2020-09-01 ENCOUNTER — HOSPITAL ENCOUNTER (OUTPATIENT)
Dept: GENERAL RADIOLOGY | Facility: HOSPITAL | Age: 40
Discharge: HOME | End: 2020-09-01
Admitting: FAMILY MEDICINE

## 2020-09-01 ENCOUNTER — OFFICE VISIT (OUTPATIENT)
Dept: FAMILY MEDICINE CLINIC | Facility: CLINIC | Age: 40
End: 2020-09-01

## 2020-09-01 VITALS
WEIGHT: 168 LBS | BODY MASS INDEX: 27 KG/M2 | DIASTOLIC BLOOD PRESSURE: 101 MMHG | SYSTOLIC BLOOD PRESSURE: 158 MMHG | HEART RATE: 70 BPM | TEMPERATURE: 97.5 F | HEIGHT: 66 IN | OXYGEN SATURATION: 99 %

## 2020-09-01 DIAGNOSIS — R05.9 COUGH: ICD-10-CM

## 2020-09-01 DIAGNOSIS — E11.9 TYPE 2 DIABETES MELLITUS WITHOUT COMPLICATION, WITH LONG-TERM CURRENT USE OF INSULIN (HCC): ICD-10-CM

## 2020-09-01 DIAGNOSIS — Z79.4 TYPE 2 DIABETES MELLITUS WITHOUT COMPLICATION, WITH LONG-TERM CURRENT USE OF INSULIN (HCC): ICD-10-CM

## 2020-09-01 DIAGNOSIS — E78.00 PURE HYPERCHOLESTEROLEMIA: ICD-10-CM

## 2020-09-01 DIAGNOSIS — R63.4 WEIGHT LOSS: ICD-10-CM

## 2020-09-01 DIAGNOSIS — F41.9 ANXIETY: ICD-10-CM

## 2020-09-01 DIAGNOSIS — I10 ESSENTIAL HYPERTENSION: Primary | ICD-10-CM

## 2020-09-01 PROCEDURE — 99214 OFFICE O/P EST MOD 30 MIN: CPT | Performed by: FAMILY MEDICINE

## 2020-09-01 PROCEDURE — 71046 X-RAY EXAM CHEST 2 VIEWS: CPT

## 2020-09-01 RX ORDER — AMLODIPINE BESYLATE 5 MG/1
5 TABLET ORAL DAILY
Qty: 90 TABLET | Refills: 1 | Status: SHIPPED | OUTPATIENT
Start: 2020-09-01 | End: 2021-03-07

## 2020-09-01 RX ORDER — LANCETS
1 EACH MISCELLANEOUS 3 TIMES DAILY
COMMUNITY
Start: 2019-05-30

## 2020-09-01 RX ORDER — LOSARTAN POTASSIUM 50 MG/1
50 TABLET ORAL DAILY
COMMUNITY
Start: 2020-04-16

## 2020-09-01 RX ORDER — DICLOFENAC SODIUM AND MISOPROSTOL 75; 200 MG/1; UG/1
1 TABLET, DELAYED RELEASE ORAL 2 TIMES DAILY
COMMUNITY
End: 2021-03-23

## 2020-09-01 RX ORDER — ROSUVASTATIN CALCIUM 10 MG/1
10 TABLET, COATED ORAL DAILY
COMMUNITY
Start: 2020-04-16

## 2020-09-01 RX ORDER — ACETAZOLAMIDE 250 MG/1
250-500 TABLET ORAL
COMMUNITY
Start: 2020-04-29

## 2020-09-01 RX ORDER — ERGOCALCIFEROL 1.25 MG/1
50000 CAPSULE ORAL 2 TIMES WEEKLY
COMMUNITY
Start: 2020-08-23

## 2020-09-01 RX ORDER — MEMANTINE HYDROCHLORIDE 10 MG/1
TABLET ORAL
COMMUNITY
Start: 2020-08-13 | End: 2023-02-01

## 2020-09-01 NOTE — PROGRESS NOTES
"Subjective   Greta Campoverde is a 40 y.o. female.     Chief Complaint   Patient presents with   • Hypertension   • Hyperlipidemia        History of Present Illness    I not seen the patient in some time.    Hypertension.  She takes losartan and metoprolol.  She is not checking her blood pressure at home.  Is high today.  Was also elevated at the urgent care center about 3 or 4 weeks ago when she went there for a cough.  Blood pressure was about 140/95.  She had some low-grade fevers.  Her COVID-19 test was negative.  She is a non-smoker.  Now she has a cough every few days and last for a few minutes.  No wheezing.  No shortness of breath.  She is also started on Diamox 250 mg in the morning and 500 mg in the evening.  Started reportedly by endocrinology.  Patient is not sure why.    Diabetes type 2.  Followed by endocrinology at [facility].  She is on NovoLog insulin along with a GLP agonist once a week.  Since the GLP agonist she is been losing weight.  She is lost about 40 pounds.  Her last A1c was 10.2% in February through [facility].     Hyperlipidemia with high triglycerides.  She is on fish oil and rosuvastatin prescribed endocrinology.    She is had some trouble with migraine headaches.      The following portions of the patient's history were reviewed and updated as appropriate: allergies, current medications, past family history, past medical history, past social history, past surgical history and problem list.          Review of Systems   Constitutional: Negative.    Respiratory: Positive for cough. Negative for shortness of breath.    Cardiovascular: Negative.    Musculoskeletal: Negative.    Neurological: Positive for headaches.   Psychiatric/Behavioral: Negative.        Objective   Blood pressure (!) 158/101, pulse 70, temperature 97.5 °F (36.4 °C), temperature source Temporal, height 167.6 cm (65.98\"), weight 76.2 kg (168 lb), SpO2 99 %.  Physical Exam   Constitutional: She is oriented to person, place, and " time. She appears well-nourished. No distress.   HENT:   Head: Atraumatic.   Mouth/Throat: Oropharynx is clear and moist. No oropharyngeal exudate.   Eyes: Conjunctivae are normal.   Neck: Normal range of motion. Neck supple.   Cardiovascular: Normal rate and regular rhythm.   Pulmonary/Chest: Effort normal and breath sounds normal. No respiratory distress. She has no wheezes.   Abdominal: Soft. She exhibits no distension and no mass. There is no tenderness. There is no guarding.   Neurological: She is alert and oriented to person, place, and time.   Skin: Skin is warm and dry. No rash noted. No pallor.   Psychiatric: She has a normal mood and affect. Her behavior is normal.       Assessment/Plan   Greta was seen today for hypertension and hyperlipidemia.    Diagnoses and all orders for this visit:    Essential hypertension    Pure hypercholesterolemia    Type 2 diabetes mellitus without complication, with long-term current use of insulin (CMS/[facility])  -     Hemoglobin A1c; Future  -     Comprehensive Metabolic Panel; Future  -     Lipid Panel; Future    Anxiety    Cough  -     XR Chest PA & Lateral    Weight loss    Other orders  -     amLODIPine (NORVASC) 5 MG tablet; Take 1 tablet by mouth Daily.      Hypertension.  Not controlled.  Continue losartan and metoprolol as is.  Adding amlodipine 5 mg a day.  I want her to check her blood pressure daily.  She should buy a home blood pressure monitor if she can.  I will see her back in 3 months for follow-up.    Diabetes type 2.  Rechecking A1c today.    Anxiety.  Longstanding.  Also followed by psychiatry.  I have been prescribing her Paxil.    Cough.  Likely post bronchitis cough.  Checking chest x-ray today.  No history of smoking.    Weight loss.  Poss related to combination of her diet and the GLP agonist.  Checking lab work today.  Recheck weight in 3 months.  She will return sooner with concerns

## 2020-09-02 LAB
ALBUMIN SERPL-MCNC: 5.1 G/DL (ref 3.8–4.8)
ALBUMIN/GLOB SERPL: 2 {RATIO} (ref 1.2–2.2)
ALP SERPL-CCNC: 68 IU/L (ref 39–117)
ALT SERPL-CCNC: 36 IU/L (ref 0–32)
AST SERPL-CCNC: 32 IU/L (ref 0–40)
BILIRUB SERPL-MCNC: 0.5 MG/DL (ref 0–1.2)
BUN SERPL-MCNC: 13 MG/DL (ref 6–24)
BUN/CREAT SERPL: 16 (ref 9–23)
CALCIUM SERPL-MCNC: 10.1 MG/DL (ref 8.7–10.2)
CHLORIDE SERPL-SCNC: 103 MMOL/L (ref 96–106)
CHOLEST SERPL-MCNC: 161 MG/DL (ref 100–199)
CO2 SERPL-SCNC: 21 MMOL/L (ref 20–29)
CREAT SERPL-MCNC: 0.8 MG/DL (ref 0.57–1)
GLOBULIN SER CALC-MCNC: 2.6 G/DL (ref 1.5–4.5)
GLUCOSE SERPL-MCNC: 135 MG/DL (ref 65–99)
HBA1C MFR BLD: 10.4 % (ref 4.8–5.6)
HDLC SERPL-MCNC: 30 MG/DL
LABORATORY COMMENT REPORT: ABNORMAL
LDLC SERPL CALC-MCNC: 68 MG/DL (ref 0–99)
POTASSIUM SERPL-SCNC: 3.6 MMOL/L (ref 3.5–5.2)
PROT SERPL-MCNC: 7.7 G/DL (ref 6–8.5)
SODIUM SERPL-SCNC: 140 MMOL/L (ref 134–144)
TRIGL SERPL-MCNC: 402 MG/DL (ref 0–149)
VLDLC SERPL CALC-MCNC: 63 MG/DL (ref 5–40)

## 2020-09-02 NOTE — PROGRESS NOTES
The cholesterol panel is much improved including the triglycerides.  However the A1c is still elevated at 10.4%.  Follow-up with endocrinology as we discussed.

## 2020-11-20 RX ORDER — PAROXETINE 30 MG/1
TABLET, FILM COATED ORAL
Qty: 90 TABLET | Refills: 1 | Status: SHIPPED | OUTPATIENT
Start: 2020-11-20 | End: 2021-05-25

## 2020-11-20 RX ORDER — METOPROLOL SUCCINATE 50 MG/1
TABLET, EXTENDED RELEASE ORAL
Qty: 90 TABLET | Refills: 1 | Status: SHIPPED | OUTPATIENT
Start: 2020-11-20 | End: 2021-05-25

## 2020-12-01 ENCOUNTER — TELEMEDICINE (OUTPATIENT)
Dept: FAMILY MEDICINE CLINIC | Facility: CLINIC | Age: 40
End: 2020-12-01

## 2020-12-01 DIAGNOSIS — I10 ESSENTIAL HYPERTENSION: Primary | ICD-10-CM

## 2020-12-01 PROCEDURE — 99213 OFFICE O/P EST LOW 20 MIN: CPT | Performed by: FAMILY MEDICINE

## 2020-12-01 NOTE — PROGRESS NOTES
Subjective   Greta Campoverde is a 40 y.o. female.     Chief Complaint   Patient presents with   • Hypertension        History of Present Illness    Coronavirus pandemic telehealth visit.  Good audio and video connection.  Patient gave informed consent through the [employer] check in process.    Hypertension follow-up.  At last visit her blood pressure is running elevated.  She has diabetes, migraines and hyperlipidemia.  She is followed by both the neurologist and the endocrinologist.  Last A1c was 10%.  We added amlodipine 5 mg a day to her losartan 50 mg a day and metoprolol 50 mg daily.  She states her blood pressure is now improved.  Most recently it was 113/76.  Is pretty much staying in that range.  She has no cardiovascular symptoms.        The following portions of the patient's history were reviewed and updated as appropriate: allergies, current medications, past family history, past medical history, past social history, past surgical history and problem list.          Review of Systems   Constitutional: Negative.    Respiratory: Negative.    Cardiovascular: Negative.        Objective   There were no vitals taken for this visit.  Physical Exam  HENT:      Head: Atraumatic.   Neck:      Musculoskeletal: Normal range of motion.   Pulmonary:      Effort: Pulmonary effort is normal. No respiratory distress.   Skin:     Coloration: Skin is not pale.   Neurological:      Mental Status: She is alert and oriented to person, place, and time.      Coordination: Coordination normal.   Psychiatric:         Behavior: Behavior normal.         Assessment/Plan   Diagnoses and all orders for this visit:    1. Essential hypertension (Primary)      Essential hypertension follow-up.  She will continue amlodipine 5 mg a day along with the losartan and metoprolol.  She will continue to monitor her blood pressure on regular basis.  I will see her back in 6 months for recheck, sooner as needed.    Duration of visit approximately 10 to  15 minutes.

## 2021-03-07 RX ORDER — AMLODIPINE BESYLATE 5 MG/1
TABLET ORAL
Qty: 90 TABLET | Refills: 0 | Status: SHIPPED | OUTPATIENT
Start: 2021-03-07 | End: 2021-06-08

## 2021-03-23 ENCOUNTER — OFFICE VISIT (OUTPATIENT)
Dept: FAMILY MEDICINE CLINIC | Facility: CLINIC | Age: 41
End: 2021-03-23

## 2021-03-23 VITALS
DIASTOLIC BLOOD PRESSURE: 79 MMHG | OXYGEN SATURATION: 100 % | HEIGHT: 66 IN | BODY MASS INDEX: 27.42 KG/M2 | HEART RATE: 73 BPM | TEMPERATURE: 97.1 F | WEIGHT: 170.6 LBS | SYSTOLIC BLOOD PRESSURE: 117 MMHG

## 2021-03-23 DIAGNOSIS — Z09 HOSPITAL DISCHARGE FOLLOW-UP: Primary | ICD-10-CM

## 2021-03-23 DIAGNOSIS — I10 ESSENTIAL HYPERTENSION: Chronic | ICD-10-CM

## 2021-03-23 DIAGNOSIS — E78.00 PURE HYPERCHOLESTEROLEMIA: Chronic | ICD-10-CM

## 2021-03-23 DIAGNOSIS — Z79.4 UNCONTROLLED TYPE 2 DIABETES MELLITUS WITH HYPERGLYCEMIA, WITH LONG-TERM CURRENT USE OF INSULIN (HCC): Chronic | ICD-10-CM

## 2021-03-23 DIAGNOSIS — D64.9 ANEMIA, UNSPECIFIED TYPE: ICD-10-CM

## 2021-03-23 DIAGNOSIS — E11.65 UNCONTROLLED TYPE 2 DIABETES MELLITUS WITH HYPERGLYCEMIA, WITH LONG-TERM CURRENT USE OF INSULIN (HCC): Chronic | ICD-10-CM

## 2021-03-23 PROBLEM — E11.10 DIABETIC KETOACIDOSIS WITHOUT COMA ASSOCIATED WITH TYPE 2 DIABETES MELLITUS (HCC): Status: ACTIVE | Noted: 2021-02-15

## 2021-03-23 PROBLEM — N39.0 SEPSIS DUE TO GRAM-NEGATIVE UTI: Status: ACTIVE | Noted: 2021-02-16

## 2021-03-23 PROBLEM — A41.51 SEPSIS DUE TO ESCHERICHIA COLI WITH ACUTE RENAL FAILURE AND SEPTIC SHOCK (HCC): Status: ACTIVE | Noted: 2021-02-15

## 2021-03-23 PROBLEM — E86.0 LUETSCHER'S SYNDROME: Status: ACTIVE | Noted: 2021-02-15

## 2021-03-23 PROBLEM — N17.9 SEPSIS DUE TO ESCHERICHIA COLI WITH ACUTE RENAL FAILURE AND SEPTIC SHOCK: Status: ACTIVE | Noted: 2021-02-15

## 2021-03-23 PROBLEM — E87.20 LACTIC ACIDOSIS: Status: ACTIVE | Noted: 2021-02-15

## 2021-03-23 PROBLEM — R65.21 SEPSIS DUE TO ESCHERICHIA COLI WITH ACUTE RENAL FAILURE AND SEPTIC SHOCK (HCC): Status: ACTIVE | Noted: 2021-02-15

## 2021-03-23 PROBLEM — E87.1 HYPONATREMIA: Status: ACTIVE | Noted: 2021-02-15

## 2021-03-23 PROBLEM — A41.50 SEPSIS DUE TO GRAM-NEGATIVE UTI (HCC): Status: ACTIVE | Noted: 2021-02-16

## 2021-03-23 PROBLEM — N17.9 AKI (ACUTE KIDNEY INJURY) (HCC): Status: ACTIVE | Noted: 2021-02-15

## 2021-03-23 PROCEDURE — 99214 OFFICE O/P EST MOD 30 MIN: CPT | Performed by: FAMILY MEDICINE

## 2021-03-23 RX ORDER — PROCHLORPERAZINE 25 MG/1
1 SUPPOSITORY RECTAL
COMMUNITY
Start: 2021-03-10

## 2021-03-23 RX ORDER — DICLOFENAC SODIUM 75 MG/1
75 TABLET, DELAYED RELEASE ORAL
COMMUNITY
End: 2023-02-01

## 2021-03-23 NOTE — PROGRESS NOTES
"Answers for HPI/ROS submitted by the patient on 3/23/2021  Please describe your symptoms.: Followup from hospital stay.  Why do i still have no strength or energy?  Just dont feel right  Have you had these symptoms before?: Yes  How long have you been having these symptoms?: Greater than 2 weeks  Please list any medications you are currently taking for this condition.: Many will bring bag of meds with me  Please describe any probable cause for these symptoms. : Hospital stay UTI unknown turned septic, was in DKA  What is the primary reason for your visit?: Other    Chief Complaint  [facility] paperwork    Subjective          Greta Campoverde presents to [facility]  History of Present Illness    Hospitalization follow-up for DKA and pyelonephritis.  She was in the hospital for a week.  She also needs [facility] paperwork filled out because she missed a couple days after the hospitalization.  She is feeling tired since the visit.  She had a acute kidney injury.  She had a significant acidosis.  Her hemoglobin on admission was 12 but on discharge was 8.9 with a low mean cell volume.  She had a hysterectomy some years ago.  She is had no bleeding episodes.  Her A1c recently was 8.5%.  She continues to follow with her endocrinologist.  She is back on an insulin pump soon.    Hypertension.  She continues losartan and metoprolol and amlodipine.  She has been taking your blood pressure medication.  Her blood pressure is low at the hospital because of the sepsis.  It is now back to normal.  She has had some fatigue since hospitalization including tiredness while exercising.    Hyperlipidemia.  She continues on Crestor 10 mg a day.  She is having no specific myalgias symptoms.      Objective   Vital Signs:   /79   Pulse 73   Temp 97.1 °F (36.2 °C) (Temporal)   Ht 167.6 cm (65.98\")   Wt 77.4 kg (170 lb 9.6 oz)   SpO2 100%   BMI 27.55 kg/m²     Physical Exam  Vitals and nursing note reviewed. "   Constitutional:       General: She is not in acute distress.     Appearance: She is well-developed.   Neck:      Thyroid: No thyromegaly.   Cardiovascular:      Rate and Rhythm: Normal rate and regular rhythm.      Heart sounds: Normal heart sounds.   Pulmonary:      Effort: Pulmonary effort is normal.      Breath sounds: Normal breath sounds.   Skin:     General: Skin is warm and dry.   Psychiatric:         Behavior: Behavior normal.         Thought Content: Thought content normal.         Judgment: Judgment normal.        Result Review :{Labs  Result Review  Imaging  Med Tab  Media :23}                 Assessment and Plan    Diagnoses and all orders for this visit:    1. Hospital discharge follow-up (Primary)    2. Uncontrolled type 2 diabetes mellitus with hyperglycemia, with long-term current use of insulin (CMS/[facility])  -     Comprehensive metabolic panel    3. Essential hypertension  -     Comprehensive metabolic panel    4. Pure hypercholesterolemia  -     Comprehensive metabolic panel    5. Anemia, unspecified type  -     Iron  -     Vitamin B12 and Folate  -     Ferritin  -     CBC and Differential  -     Reticulocytes  -     Comprehensive metabolic panel  -     TSH Rfx On Abnormal To Free T4      Hospital discharge follow-up for DKA and pyelonephritis.  She is overall feeling better with exception of some ongoing fatigue and mild exercise intolerance.  She was fairly anemic at the hospital discharge.  Like related to the illness.  I am rechecking iron studies today along with other anemia work-up labs.  Checking a TSH.  Rechecking her CMP given the recent acute kidney injury.  Her creatinine had normalized at discharge I believe.    Hypertension.  Well-controlled now.    Diabetes type 2.  With hyperglycemia.  She is back with her endocrinologist.  They are advancing her insulin.    Hyperlipidemia.  She continues rosuvastatin.  I will see her back in 6 months for recheck.  Sooner as needed.  [facility]  paperwork filled out today.      Follow Up   No follow-ups on file.  Patient was given instructions and counseling regarding her condition or for health maintenance advice. Please see specific information pulled into the AVS if appropriate.

## 2021-03-24 LAB
ALBUMIN SERPL-MCNC: 4.5 G/DL (ref 3.5–5.2)
ALBUMIN/GLOB SERPL: 1.3 G/DL
ALP SERPL-CCNC: 83 U/L (ref 39–117)
ALT SERPL-CCNC: 16 U/L (ref 1–33)
AST SERPL-CCNC: 20 U/L (ref 1–32)
BASOPHILS # BLD AUTO: 0.06 10*3/MM3 (ref 0–0.2)
BASOPHILS NFR BLD AUTO: 0.9 % (ref 0–1.5)
BILIRUB SERPL-MCNC: 0.3 MG/DL (ref 0–1.2)
BUN SERPL-MCNC: 12 MG/DL (ref 6–20)
BUN/CREAT SERPL: 16 (ref 7–25)
CALCIUM SERPL-MCNC: 9.9 MG/DL (ref 8.6–10.5)
CHLORIDE SERPL-SCNC: 104 MMOL/L (ref 98–107)
CO2 SERPL-SCNC: 22.2 MMOL/L (ref 22–29)
CREAT SERPL-MCNC: 0.75 MG/DL (ref 0.57–1)
EOSINOPHIL # BLD AUTO: 0.33 10*3/MM3 (ref 0–0.4)
EOSINOPHIL NFR BLD AUTO: 4.9 % (ref 0.3–6.2)
ERYTHROCYTE [DISTWIDTH] IN BLOOD BY AUTOMATED COUNT: 13.4 % (ref 12.3–15.4)
FERRITIN SERPL-MCNC: 253 NG/ML (ref 13–150)
FOLATE SERPL-MCNC: 6.46 NG/ML (ref 4.78–24.2)
GLOBULIN SER CALC-MCNC: 3.5 GM/DL
GLUCOSE SERPL-MCNC: 209 MG/DL (ref 65–99)
HCT VFR BLD AUTO: 36.1 % (ref 34–46.6)
HGB BLD-MCNC: 11.8 G/DL (ref 12–15.9)
IMM GRANULOCYTES # BLD AUTO: 0.02 10*3/MM3 (ref 0–0.05)
IMM GRANULOCYTES NFR BLD AUTO: 0.3 % (ref 0–0.5)
IRON SERPL-MCNC: 50 MCG/DL (ref 37–145)
LYMPHOCYTES # BLD AUTO: 2.52 10*3/MM3 (ref 0.7–3.1)
LYMPHOCYTES NFR BLD AUTO: 37.7 % (ref 19.6–45.3)
MCH RBC QN AUTO: 27.2 PG (ref 26.6–33)
MCHC RBC AUTO-ENTMCNC: 32.7 G/DL (ref 31.5–35.7)
MCV RBC AUTO: 83.2 FL (ref 79–97)
MONOCYTES # BLD AUTO: 0.34 10*3/MM3 (ref 0.1–0.9)
MONOCYTES NFR BLD AUTO: 5.1 % (ref 5–12)
NEUTROPHILS # BLD AUTO: 3.42 10*3/MM3 (ref 1.7–7)
NEUTROPHILS NFR BLD AUTO: 51.1 % (ref 42.7–76)
NRBC BLD AUTO-RTO: 0 /100 WBC (ref 0–0.2)
PLATELET # BLD AUTO: 313 10*3/MM3 (ref 140–450)
POTASSIUM SERPL-SCNC: 4.1 MMOL/L (ref 3.5–5.2)
PROT SERPL-MCNC: 8 G/DL (ref 6–8.5)
RBC # BLD AUTO: 4.34 10*6/MM3 (ref 3.77–5.28)
RETICS/RBC NFR AUTO: 2.26 % (ref 0.7–1.9)
SODIUM SERPL-SCNC: 139 MMOL/L (ref 136–145)
TSH SERPL DL<=0.005 MIU/L-ACNC: 1.41 UIU/ML (ref 0.27–4.2)
VIT B12 SERPL-MCNC: 323 PG/ML (ref 211–946)
WBC # BLD AUTO: 6.69 10*3/MM3 (ref 3.4–10.8)

## 2021-05-25 RX ORDER — PAROXETINE 30 MG/1
TABLET, FILM COATED ORAL
Qty: 90 TABLET | Refills: 1 | Status: SHIPPED | OUTPATIENT
Start: 2021-05-25 | End: 2021-11-22

## 2021-05-25 RX ORDER — METOPROLOL SUCCINATE 50 MG/1
TABLET, EXTENDED RELEASE ORAL
Qty: 90 TABLET | Refills: 1 | Status: SHIPPED | OUTPATIENT
Start: 2021-05-25 | End: 2021-11-22

## 2021-06-08 RX ORDER — AMLODIPINE BESYLATE 5 MG/1
TABLET ORAL
Qty: 90 TABLET | Refills: 1 | Status: SHIPPED | OUTPATIENT
Start: 2021-06-08 | End: 2021-12-15

## 2021-08-17 ENCOUNTER — TELEMEDICINE (OUTPATIENT)
Dept: FAMILY MEDICINE CLINIC | Facility: CLINIC | Age: 41
End: 2021-08-17

## 2021-08-17 DIAGNOSIS — N30.90 CYSTITIS: Primary | ICD-10-CM

## 2021-08-17 PROCEDURE — 99213 OFFICE O/P EST LOW 20 MIN: CPT | Performed by: FAMILY MEDICINE

## 2021-08-17 RX ORDER — NITROFURANTOIN 25; 75 MG/1; MG/1
100 CAPSULE ORAL 2 TIMES DAILY
Qty: 14 CAPSULE | Refills: 0 | Status: SHIPPED | OUTPATIENT
Start: 2021-08-17 | End: 2023-02-01

## 2021-08-17 NOTE — PROGRESS NOTES
Chief Complaint  No chief complaint on file.    Chief complaint, UTI symptoms    Subjective          Greta Campoverde presents to [facility]  History of Present Illness     MyChart video visit.  Informed consent given during the check-in process.  She is complaining of a day or 2 of UTI symptoms.  Burning with urination and some frequency.  Some pressure.  No fevers.  No flank pain.  He had previous urosepsis last year with positive blood cultures at [facility].  She is allergic to Bactrim which causes nausea.  She has had no hematuria.  She is not in the office today because her son has COVID-19.  The patient is vaccinated against COVID-19 and has no URI symptoms.    Objective   Vital Signs:   There were no vitals taken for this visit.    Physical Exam   Result Review :            Physical exam.  She appears in no acute distress.  Good color.         Assessment and Plan    Diagnoses and all orders for this visit:    1. Cystitis (Primary)    Other orders  -     nitrofurantoin, macrocrystal-monohydrate, (Macrobid) 100 MG capsule; Take 1 capsule by mouth 2 (Two) Times a Day.  Dispense: 14 capsule; Refill: 0      Probable uncomplicated lower urinary tract infection/cystitis.  Macrobid twice a day for 7 days.  With severe symptoms such as fever, flank pain, vomiting or other severe symptoms she will seek medical attention immediately.  Otherwise continue hydration.    Duration of visit approximately 10 minutes      Follow Up   No follow-ups on file.  Patient was given instructions and counseling regarding her condition or for health maintenance advice. Please see specific information pulled into the AVS if appropriate.

## 2021-11-22 RX ORDER — METOPROLOL SUCCINATE 50 MG/1
TABLET, EXTENDED RELEASE ORAL
Qty: 90 TABLET | Refills: 1 | Status: SHIPPED | OUTPATIENT
Start: 2021-11-22 | End: 2022-06-07

## 2021-11-22 RX ORDER — PAROXETINE 30 MG/1
TABLET, FILM COATED ORAL
Qty: 90 TABLET | Refills: 1 | Status: SHIPPED | OUTPATIENT
Start: 2021-11-22 | End: 2022-06-07

## 2021-11-22 NOTE — TELEPHONE ENCOUNTER
Rx Refill Note  Requested Prescriptions     Pending Prescriptions Disp Refills   • PARoxetine (PAXIL) 30 MG tablet [Pharmacy Med Name: PARoxetine HCl 30 MG Oral Tablet] 90 tablet 0     Sig: TAKE 1 TABLET BY MOUTH ONCE DAILY AS DIRECTED   • metoprolol succinate XL (TOPROL-XL) 50 MG 24 hr tablet [Pharmacy Med Name: Metoprolol Succinate ER 50 MG Oral Tablet Extended Release 24 Hour] 90 tablet 0     Sig: Take 1 tablet by mouth once daily      Last office visit with prescribing clinician: 3/23/2021      Next office visit with prescribing clinician: Visit date not found            Daria Pitts MA  11/22/21, 10:27 EST

## 2021-12-15 RX ORDER — AMLODIPINE BESYLATE 5 MG/1
TABLET ORAL
Qty: 90 TABLET | Refills: 1 | Status: SHIPPED | OUTPATIENT
Start: 2021-12-15 | End: 2022-06-17

## 2021-12-15 NOTE — TELEPHONE ENCOUNTER
Rx Refill Note  Requested Prescriptions     Pending Prescriptions Disp Refills   • amLODIPine (NORVASC) 5 MG tablet [Pharmacy Med Name: amLODIPine Besylate 5 MG Oral Tablet] 90 tablet 0     Sig: Take 1 tablet by mouth once daily      Last office visit with prescribing clinician: 3/23/2021      Next office visit with prescribing clinician: Visit date not found            Daria Pitts MA  12/15/21, 08:16 EST

## 2022-01-25 ENCOUNTER — TELEMEDICINE (OUTPATIENT)
Dept: FAMILY MEDICINE CLINIC | Facility: CLINIC | Age: 42
End: 2022-01-25

## 2022-01-25 DIAGNOSIS — R30.0 DYSURIA: Primary | ICD-10-CM

## 2022-01-25 PROCEDURE — 99213 OFFICE O/P EST LOW 20 MIN: CPT | Performed by: FAMILY MEDICINE

## 2022-01-25 RX ORDER — CEPHALEXIN 500 MG/1
500 CAPSULE ORAL 3 TIMES DAILY
Qty: 15 CAPSULE | Refills: 0 | Status: SHIPPED | OUTPATIENT
Start: 2022-01-25 | End: 2023-02-01

## 2022-01-25 NOTE — PROGRESS NOTES
Chief Complaint  Urinary Tract Infection    Subjective          Greta Campoverde presents to [facility]  History of Present Illness     Coronavirus pandemic telehealth visit.  Good audio and video connection.  Patient gave informed consent through the [employer] check in process.    3 or 4 days of burning with urination frequent urination some urgency some lower abdominal pain no fevers or chills no flank pain no hematuria.  She has diabetes followed by endocrinology.  Last year she developed acidosis related to a probable UTI with DKA also.  She started with some UTI symptoms and was initially treated with nitrofurantoin.  I reviewed the chart.  I do not see a previous urine culture.  She is unable to give the office today because her children have Covid and she has to be at home with them.  She does not have specific Covid symptoms.    Objective   Vital Signs:   There were no vitals taken for this visit.    Physical Exam  Constitutional:       Comments: She appears no acute distress.        Result Review :              She states at home urinalysis reveals no blood, pH of 5, negative nitrates, trace leukocyte esterase.       Assessment and Plan    Diagnoses and all orders for this visit:    1. Dysuria (Primary)    Other orders  -     cephalexin (Keflex) 500 MG capsule; Take 1 capsule by mouth 3 (Three) Times a Day.  Dispense: 15 capsule; Refill: 0      Dysuria.  Possible cystitis.  Likely pyelonephritis.  The urinalysis is equivocal.  Patient is unable to get to the office because her children have Covid and she cannot leave them.  At this point I would not treat empirically for a uncomplicated bladder infection with cephalexin 500 mg 3 times a day for 5 days.  She is allergic to sulfa.  She understands seek medical attention if not improving, or with sudden severe symptoms.  She will contact us with questions.  She will follow up with her endocrinologist as scheduled.        I spent 15  minutes caring for Greta on this date of service. This time includes time spent by me in the following activities:preparing for the visit, reviewing tests, performing a medically appropriate examination and/or evaluation , counseling and educating the patient/family/caregiver, ordering medications, tests, or procedures and documenting information in the medical record  Follow Up   No follow-ups on file.  Patient was given instructions and counseling regarding her condition or for health maintenance advice. Please see specific information pulled into the AVS if appropriate.

## 2022-06-07 RX ORDER — METOPROLOL SUCCINATE 50 MG/1
TABLET, EXTENDED RELEASE ORAL
Qty: 90 TABLET | Refills: 0 | Status: SHIPPED | OUTPATIENT
Start: 2022-06-07 | End: 2022-08-31

## 2022-06-07 RX ORDER — PAROXETINE 30 MG/1
TABLET, FILM COATED ORAL
Qty: 90 TABLET | Refills: 0 | Status: SHIPPED | OUTPATIENT
Start: 2022-06-07 | End: 2022-09-06

## 2022-06-07 NOTE — TELEPHONE ENCOUNTER
Rx Refill Note  Requested Prescriptions     Pending Prescriptions Disp Refills   • PARoxetine (PAXIL) 30 MG tablet [Pharmacy Med Name: PARoxetine HCl 30 MG Oral Tablet] 90 tablet 0     Sig: TAKE 1 TABLET BY MOUTH ONCE DAILY AS DIRECTED   • metoprolol succinate XL (TOPROL-XL) 50 MG 24 hr tablet [Pharmacy Med Name: Metoprolol Succinate ER 50 MG Oral Tablet Extended Release 24 Hour] 90 tablet 0     Sig: Take 1 tablet by mouth once daily      Last office visit with prescribing clinician: 3/23/2021      Next office visit with prescribing clinician: Visit date not found       {TIP  Please add Last Relevant Lab Date if appropriate: 3/23/2021    MI Hurley  06/07/22, 08:22 EDT

## 2022-06-17 RX ORDER — AMLODIPINE BESYLATE 5 MG/1
TABLET ORAL
Qty: 90 TABLET | Refills: 1 | Status: SHIPPED | OUTPATIENT
Start: 2022-06-17 | End: 2022-12-27

## 2022-06-17 NOTE — TELEPHONE ENCOUNTER
Rx Refill Note  Requested Prescriptions     Pending Prescriptions Disp Refills   • amLODIPine (NORVASC) 5 MG tablet [Pharmacy Med Name: amLODIPine Besylate 5 MG Oral Tablet] 90 tablet 0     Sig: Take 1 tablet by mouth once daily      Last office visit with prescribing clinician: 3/23/2021      Next office visit with prescribing clinician: Visit date not found       {TIP  Please add Last Relevant Lab Date if appropriate: 3/23/2021    MI Hurley  06/17/22, 09:02 EDT

## 2022-08-31 RX ORDER — METOPROLOL SUCCINATE 50 MG/1
TABLET, EXTENDED RELEASE ORAL
Qty: 90 TABLET | Refills: 0 | Status: SHIPPED | OUTPATIENT
Start: 2022-08-31 | End: 2022-12-12

## 2022-08-31 NOTE — TELEPHONE ENCOUNTER
Rx Refill Note  Requested Prescriptions     Pending Prescriptions Disp Refills   • metoprolol succinate XL (TOPROL-XL) 50 MG 24 hr tablet [Pharmacy Med Name: Metoprolol Succinate ER 50 MG Oral Tablet Extended Release 24 Hour] 90 tablet 0     Sig: Take 1 tablet by mouth once daily      Last office visit with prescribing clinician: Tele 01/25/2022      Next office visit with prescribing clinician: Visit date not found            Sara Youngblood MA  08/31/22, 10:33 EDT

## 2022-09-06 RX ORDER — PAROXETINE 30 MG/1
TABLET, FILM COATED ORAL
Qty: 90 TABLET | Refills: 0 | Status: SHIPPED | OUTPATIENT
Start: 2022-09-06 | End: 2022-12-12

## 2022-11-16 RX ORDER — OSELTAMIVIR PHOSPHATE 75 MG/1
75 CAPSULE ORAL DAILY
Qty: 10 CAPSULE | Refills: 0 | Status: SHIPPED | OUTPATIENT
Start: 2022-11-16 | End: 2022-11-17 | Stop reason: SDUPTHER

## 2022-11-16 NOTE — TELEPHONE ENCOUNTER
----- Message from Patrick Olivo MD sent at 11/15/2022  3:58 PM EST -----  Regarding: FW: Flu A  Contact: [phone]  Given her medical problems, it would be reasonable to start her on Tamiflu prophylaxis.  I am recommending Tamiflu 75 mg 1 p.o. daily #10 no refills.  With severe symptoms she should seek medical attention.  ----- Message -----  From: Anne Alvares  Sent: 11/15/2022   3:35 PM EST  To: Patrick Olivo MD  Subject: FW: Flu A                                          ----- Message -----  From: Greta Campoverde  Sent: 11/15/2022   2:51 PM EST  To: [facility]  Subject: Flu A                                            My daughter just tested positive for flu a can you call me in something for myself

## 2022-11-16 NOTE — TELEPHONE ENCOUNTER
Rx Refill Note  Requested Prescriptions     Pending Prescriptions Disp Refills   • oseltamivir (Tamiflu) 75 MG capsule 10 capsule 0     Sig: Take 1 capsule by mouth Daily.      Last office visit with prescribing clinician: 3/23/2021      Next office visit with prescribing clinician: Visit date not found            Katelin Rodríguez  11/16/22, 16:48 EST

## 2022-11-17 RX ORDER — OSELTAMIVIR PHOSPHATE 75 MG/1
75 CAPSULE ORAL DAILY
Qty: 10 CAPSULE | Refills: 0 | Status: SHIPPED | OUTPATIENT
Start: 2022-11-17 | End: 2023-02-01

## 2022-11-17 NOTE — TELEPHONE ENCOUNTER
Rx Refill Note  Requested Prescriptions     Pending Prescriptions Disp Refills   • oseltamivir (Tamiflu) 75 MG capsule 10 capsule 0     Sig: Take 1 capsule by mouth Daily.      Last office visit with prescribing clinician: 3/23/2021      Next office visit with prescribing clinician: Visit date not found            Katelin Rodríguez  11/17/22, 08:24 EST

## 2022-11-17 NOTE — TELEPHONE ENCOUNTER
----- Message from Greta Campoverde sent at 11/16/2022  5:21 PM EST -----  Regarding: Tamiflu   Contact: [phone]  [facility] is out, can you send it to [facility] on [address]?

## 2022-12-12 RX ORDER — METOPROLOL SUCCINATE 50 MG/1
TABLET, EXTENDED RELEASE ORAL
Qty: 90 TABLET | Refills: 0 | Status: SHIPPED | OUTPATIENT
Start: 2022-12-12 | End: 2023-03-14

## 2022-12-12 RX ORDER — PAROXETINE 30 MG/1
TABLET, FILM COATED ORAL
Qty: 90 TABLET | Refills: 0 | Status: SHIPPED | OUTPATIENT
Start: 2022-12-12 | End: 2023-03-14

## 2022-12-27 RX ORDER — AMLODIPINE BESYLATE 5 MG/1
TABLET ORAL
Qty: 90 TABLET | Refills: 0 | Status: SHIPPED | OUTPATIENT
Start: 2022-12-27

## 2023-02-01 ENCOUNTER — OFFICE VISIT (OUTPATIENT)
Dept: FAMILY MEDICINE CLINIC | Facility: CLINIC | Age: 43
End: 2023-02-01
Payer: COMMERCIAL

## 2023-02-01 VITALS
OXYGEN SATURATION: 94 % | HEART RATE: 94 BPM | HEIGHT: 66 IN | WEIGHT: 193.1 LBS | BODY MASS INDEX: 31.03 KG/M2 | RESPIRATION RATE: 18 BRPM | SYSTOLIC BLOOD PRESSURE: 140 MMHG | DIASTOLIC BLOOD PRESSURE: 82 MMHG | TEMPERATURE: 97.1 F

## 2023-02-01 DIAGNOSIS — R35.0 URINARY FREQUENCY: Primary | ICD-10-CM

## 2023-02-01 LAB
BILIRUB BLD-MCNC: NEGATIVE MG/DL
CLARITY, POC: CLEAR
COLOR UR: YELLOW
EXPIRATION DATE: ABNORMAL
GLUCOSE UR STRIP-MCNC: ABNORMAL MG/DL
KETONES UR QL: NEGATIVE
LEUKOCYTE EST, POC: NEGATIVE
Lab: ABNORMAL
NITRITE UR-MCNC: NEGATIVE MG/ML
PH UR: 6 [PH] (ref 5–8)
PROT UR STRIP-MCNC: ABNORMAL MG/DL
RBC # UR STRIP: NEGATIVE /UL
SP GR UR: 1.02 (ref 1–1.03)
UROBILINOGEN UR QL: ABNORMAL

## 2023-02-01 PROCEDURE — 81003 URINALYSIS AUTO W/O SCOPE: CPT | Performed by: NURSE PRACTITIONER

## 2023-02-01 PROCEDURE — 99213 OFFICE O/P EST LOW 20 MIN: CPT | Performed by: NURSE PRACTITIONER

## 2023-02-01 RX ORDER — NITROFURANTOIN 25; 75 MG/1; MG/1
100 CAPSULE ORAL 2 TIMES DAILY
Qty: 14 CAPSULE | Refills: 0 | Status: SHIPPED | OUTPATIENT
Start: 2023-02-01

## 2023-02-01 NOTE — PROGRESS NOTES
"Chief Complaint  Urinary Tract Infection (1 week)    Subjective        Greta Campoverde presents to [facility]  History of Present Illness    Greta Campoverde is a 42-year-old female who presents to the clinic today for evaluation of urinary frequency for 1 week.     Ms. Campoverde is concerned she might have a urinary tract infection. She has noticed a severe odor to her urine for the past week, adding that it is not a foul odor, it is more like a \"sweet smell.\" She is experiencing lower abdominal pain. She describes it as \"not like a throw up hurt, kind of like a pulling.\" She is having to urinate several times in an hour. She denies any fever or chills, even though she has felt warm to herself. She feels like she is drinking plenty of fluids, adding that she drinks water all the time. The last time she had a urinary tract infection she was admitted to the hospital not only for a UTI but diabetic ketoacidosis.     She has a history of uncontrolled type 2 diabetes mellitus. She wears a [employer] continuous glucose monitoring system and just checked her glucose. Her non-fasting blood sugar was 189 mg/dL, noting that she just ate lunch not long ago. She notes that her glucose has been fairly good.  She reports that she is always thirstier when her glucose is elevated. From 01/26/2023 to today, her glucose has been mostly in the range of 70 to 180 mg/dL. However, 41 percent of that time her glucose was 180 to 240 mg/dL and 13 percent of the time her glucose was greater than 240 mg/dL. She does not know if she is drinking mor than normal, but she is hungrier than normal.  Her last A1c was 8.1 percent in 12/2022. She is currently utilizing insulin, Ozempic, and metformin. She follows endocrinology with [facility] for diabetes management. She has a follow-up with them in 03/2023.      She also complains of frequent migraines and cannot seem to get them to stop. " "She follows neurology for migraine treatment. She has tried Botox in the past but she does not follow up. She takes Ubrelvy with minimal relief.       Objective   Vital Signs:  /82 (BP Location: Left arm, Patient Position: Sitting, Cuff Size: Adult)   Pulse 94   Temp 97.1 °F (36.2 °C) (Temporal)   Resp 18   Ht 167.6 cm (65.98\")   Wt 87.6 kg (193 lb 1.6 oz)   SpO2 94%   BMI 31.18 kg/m²   Estimated body mass index is 31.18 kg/m² as calculated from the following:    Height as of this encounter: 167.6 cm (65.98\").    Weight as of this encounter: 87.6 kg (193 lb 1.6 oz).          Physical Exam  Constitutional:       General: She is not in acute distress.     Appearance: She is well-developed. She is not diaphoretic.   Cardiovascular:      Rate and Rhythm: Normal rate and regular rhythm.      Heart sounds: Normal heart sounds. No murmur heard.    No friction rub. No gallop.   Pulmonary:      Effort: Pulmonary effort is normal. No respiratory distress.      Breath sounds: Normal breath sounds. No wheezing or rales.   Abdominal:      General: Bowel sounds are normal. There is no distension.      Palpations: Abdomen is soft.      Tenderness: There is no abdominal tenderness.   Musculoskeletal:      Cervical back: Neck supple.   Skin:     General: Skin is warm and dry.   Neurological:      Mental Status: She is alert and oriented to person, place, and time.           Result Review :  Point of care urinalysis dipstick today is abnormal. Positive for elevated levels of glucose at 500 mg/dL and positive for protein. Nitrates and leukocytes were negative. Ketone negative.     Point of care glycosylated hemoglobin A1c dated 12/08/2022 was 8.1 percent.                      Assessment and Plan   Diagnoses and all orders for this visit:    1. Urinary frequency (Primary)  -     POC Urinalysis Dipstick, Automated  -     Urine Culture - Urine, Urine, Clean Catch    Other orders  -     nitrofurantoin, " macrocrystal-monohydrate, (Macrobid) 100 MG capsule; Take 1 capsule by mouth 2 (Two) Times a Day.  Dispense: 14 capsule; Refill: 0      1. Urinary frequency  Point of care urinalysis dipstick today is abnormal. Positive for elevated levels of glucose at 500 mg/dL and positive for protein. Nitrates and leukocytes were negative. She is diabetic. She is not taking a drug class that would cause glucose to spill in the urine. Her A1c in 12/2022 was 8.1 percent. She does report drinking more fluids, ketone negative. Order has been placed for urine culture. In the meantime, she will start an antibiotic. Prescription has been sent for Macrobid 100 mg to take twice daily for 7 days. We will discontinue antibiotics pending urine culture. If her symptoms worsen, she is to call the clinic or go to urgent care. It was recommended she follow up with Dr. Olivo if symptoms fail to improve.        Follow Up   No follow-ups on file.  Patient was given instructions and counseling regarding her condition or for health maintenance advice. Please see specific information pulled into the AVS if appropriate.       Transcribed from ambient dictation for GABY Esposito by Mandie Becker.  02/01/23   18:21 EST    Patient or patient representative verbalized consent to the visit recording.  I have personally performed the services described in this document as transcribed by the above individual, and it is both accurate and complete.

## 2023-02-06 LAB
BACTERIA UR CULT: ABNORMAL
OTHER ANTIBIOTIC SUSC ISLT: ABNORMAL

## 2023-03-14 RX ORDER — METOPROLOL SUCCINATE 50 MG/1
TABLET, EXTENDED RELEASE ORAL
Qty: 90 TABLET | Refills: 1 | Status: SHIPPED | OUTPATIENT
Start: 2023-03-14

## 2023-03-14 RX ORDER — PAROXETINE 30 MG/1
TABLET, FILM COATED ORAL
Qty: 90 TABLET | Refills: 1 | Status: SHIPPED | OUTPATIENT
Start: 2023-03-14

## 2023-03-14 NOTE — TELEPHONE ENCOUNTER
Rx Refill Note  Requested Prescriptions     Pending Prescriptions Disp Refills   • PARoxetine (PAXIL) 30 MG tablet [Pharmacy Med Name: PARoxetine HCl 30 MG Oral Tablet] 90 tablet 1     Sig: TAKE 1 TABLET BY MOUTH ONCE DAILY AS DIRECTED   • metoprolol succinate XL (TOPROL-XL) 50 MG 24 hr tablet [Pharmacy Med Name: Metoprolol Succinate ER 50 MG Oral Tablet Extended Release 24 Hour] 90 tablet 1     Sig: Take 1 tablet by mouth once daily      Last office visit with prescribing clinician: 3/23/2021   Last telemedicine visit with prescribing clinician: Visit date not found   Next office visit with prescribing clinician: Visit date not found                         Would you like a call back once the refill request has been completed: [] Yes [] No    If the office needs to give you a call back, can they leave a voicemail: [] Yes [] No    Katelin Rodríguez  03/14/23, 11:58 EDT

## 2023-09-19 RX ORDER — METOPROLOL SUCCINATE 50 MG/1
TABLET, EXTENDED RELEASE ORAL
Qty: 90 TABLET | Refills: 0 | Status: SHIPPED | OUTPATIENT
Start: 2023-09-19

## 2023-09-27 RX ORDER — PAROXETINE 30 MG/1
TABLET, FILM COATED ORAL
Qty: 90 TABLET | Refills: 1 | Status: SHIPPED | OUTPATIENT
Start: 2023-09-27

## 2023-09-27 NOTE — TELEPHONE ENCOUNTER
Rx Refill Note  Requested Prescriptions     Pending Prescriptions Disp Refills    PARoxetine (PAXIL) 30 MG tablet [Pharmacy Med Name: PARoxetine HCl 30 MG Oral Tablet] 90 tablet 1     Sig: TAKE 1 TABLET BY MOUTH ONCE DAILY AS DIRECTED      Last office visit with prescribing clinician: 7/18/2023   Last telemedicine visit with prescribing clinician: Visit date not found   Next office visit with prescribing clinician: 10/23/2023                         Would you like a call back once the refill request has been completed: [] Yes [] No    If the office needs to give you a call back, can they leave a voicemail: [] Yes [] No    Katelin Rodríguez  09/27/23, 08:07 EDT

## 2023-12-18 RX ORDER — METOPROLOL SUCCINATE 50 MG/1
TABLET, EXTENDED RELEASE ORAL
Qty: 90 TABLET | Refills: 1 | Status: SHIPPED | OUTPATIENT
Start: 2023-12-18

## 2023-12-18 NOTE — TELEPHONE ENCOUNTER
Rx Refill Note  Requested Prescriptions     Pending Prescriptions Disp Refills    metoprolol succinate XL (TOPROL-XL) 50 MG 24 hr tablet [Pharmacy Med Name: Metoprolol Succinate ER 50 MG Oral Tablet Extended Release 24 Hour] 90 tablet 1     Sig: Take 1 tablet by mouth once daily      Last office visit with prescribing clinician: 7/18/2023   Last telemedicine visit with prescribing clinician: Visit date not found   Next office visit with prescribing clinician: Visit date not found                         Would you like a call back once the refill request has been completed: [] Yes [] No    If the office needs to give you a call back, can they leave a voicemail: [] Yes [] No    Katelin Rodríguez  12/18/23, 09:06 EST

## 2024-01-08 RX ORDER — AMLODIPINE BESYLATE 5 MG/1
TABLET ORAL
Qty: 90 TABLET | Refills: 1 | Status: SHIPPED | OUTPATIENT
Start: 2024-01-08

## 2024-01-08 NOTE — TELEPHONE ENCOUNTER
Rx Refill Note  Requested Prescriptions     Pending Prescriptions Disp Refills    amLODIPine (NORVASC) 5 MG tablet [Pharmacy Med Name: amLODIPine Besylate 5 MG Oral Tablet] 90 tablet 0     Sig: Take 1 tablet by mouth once daily      Last office visit with prescribing clinician: 7/18/2023   Last telemedicine visit with prescribing clinician: Visit date not found   Next office visit with prescribing clinician: Visit date not found                         Would you like a call back once the refill request has been completed: [] Yes [] No    If the office needs to give you a call back, can they leave a voicemail: [] Yes [] No    Katelin Rodríguez  01/08/24, 08:11 EST